# Patient Record
Sex: MALE | Race: ASIAN | NOT HISPANIC OR LATINO | Employment: OTHER | URBAN - METROPOLITAN AREA
[De-identification: names, ages, dates, MRNs, and addresses within clinical notes are randomized per-mention and may not be internally consistent; named-entity substitution may affect disease eponyms.]

---

## 2023-01-28 ENCOUNTER — HOSPITAL ENCOUNTER (EMERGENCY)
Facility: MEDICAL CENTER | Age: 28
End: 2023-01-28
Attending: EMERGENCY MEDICINE
Payer: OTHER GOVERNMENT

## 2023-01-28 ENCOUNTER — APPOINTMENT (OUTPATIENT)
Dept: RADIOLOGY | Facility: MEDICAL CENTER | Age: 28
End: 2023-01-28
Attending: EMERGENCY MEDICINE
Payer: OTHER GOVERNMENT

## 2023-01-28 VITALS
DIASTOLIC BLOOD PRESSURE: 76 MMHG | WEIGHT: 211.2 LBS | TEMPERATURE: 96.6 F | HEIGHT: 69 IN | RESPIRATION RATE: 16 BRPM | SYSTOLIC BLOOD PRESSURE: 158 MMHG | OXYGEN SATURATION: 98 % | BODY MASS INDEX: 31.28 KG/M2 | HEART RATE: 82 BPM

## 2023-01-28 DIAGNOSIS — S43.004A DISLOCATION OF RIGHT SHOULDER JOINT, INITIAL ENCOUNTER: ICD-10-CM

## 2023-01-28 PROCEDURE — 94760 N-INVAS EAR/PLS OXIMETRY 1: CPT

## 2023-01-28 PROCEDURE — 94799 UNLISTED PULMONARY SVC/PX: CPT

## 2023-01-28 PROCEDURE — 700111 HCHG RX REV CODE 636 W/ 250 OVERRIDE (IP): Performed by: EMERGENCY MEDICINE

## 2023-01-28 PROCEDURE — 23650 CLTX SHO DSLC W/MNPJ WO ANES: CPT

## 2023-01-28 PROCEDURE — 96374 THER/PROPH/DIAG INJ IV PUSH: CPT | Mod: XU

## 2023-01-28 PROCEDURE — 99285 EMERGENCY DEPT VISIT HI MDM: CPT

## 2023-01-28 PROCEDURE — 96375 TX/PRO/DX INJ NEW DRUG ADDON: CPT | Mod: XU

## 2023-01-28 PROCEDURE — 73030 X-RAY EXAM OF SHOULDER: CPT | Mod: LT

## 2023-01-28 PROCEDURE — 73080 X-RAY EXAM OF ELBOW: CPT | Mod: LT

## 2023-01-28 RX ORDER — OXYCODONE HYDROCHLORIDE AND ACETAMINOPHEN 5; 325 MG/1; MG/1
1 TABLET ORAL EVERY 6 HOURS PRN
Qty: 12 TABLET | Refills: 0 | Status: SHIPPED | OUTPATIENT
Start: 2023-01-28 | End: 2023-01-31

## 2023-01-28 RX ORDER — HYDROMORPHONE HYDROCHLORIDE 1 MG/ML
0.5 INJECTION, SOLUTION INTRAMUSCULAR; INTRAVENOUS; SUBCUTANEOUS ONCE
Status: COMPLETED | OUTPATIENT
Start: 2023-01-28 | End: 2023-01-28

## 2023-01-28 RX ORDER — ONDANSETRON 2 MG/ML
4 INJECTION INTRAMUSCULAR; INTRAVENOUS ONCE
Status: COMPLETED | OUTPATIENT
Start: 2023-01-28 | End: 2023-01-28

## 2023-01-28 RX ORDER — PROPOFOL 10 MG/ML
200 INJECTION, EMULSION INTRAVENOUS ONCE
Status: COMPLETED | OUTPATIENT
Start: 2023-01-28 | End: 2023-01-28

## 2023-01-28 RX ADMIN — HYDROMORPHONE HYDROCHLORIDE 0.5 MG: 1 INJECTION, SOLUTION INTRAMUSCULAR; INTRAVENOUS; SUBCUTANEOUS at 15:44

## 2023-01-28 RX ADMIN — PROPOFOL 100 MG: 10 INJECTION, EMULSION INTRAVENOUS at 16:04

## 2023-01-28 RX ADMIN — ONDANSETRON 4 MG: 2 INJECTION INTRAMUSCULAR; INTRAVENOUS at 15:43

## 2023-01-28 NOTE — ED NOTES
Pt to bev 10  Noted pt in pain, shaking   Pt's shirt removed  Sling applied and ice pack given   XR ordered

## 2023-01-29 NOTE — RESPIRATORY CARE
Conscious Sedation Respiratory Update       O2 (LPM): 1 (01/28/23 0940)       Events/Summary/Plan: pt awake & conversant following procedure.  02 decreased to 1 lpm nc ETC02 monitoring continuous prior, during and shortly after procedure

## 2023-01-29 NOTE — ED NOTES
Pt awake, alert and oriented and is cleared for d/c  dischg instructions given to pt  Verbally understands  Aware that Rx percocet was sent to listed pharmacy and he is to  med's and take as directed  D/c'ed to home w/ friend driving pt home

## 2023-01-29 NOTE — ED NOTES
Procedure with sedation completed.  No adverse reaction during procedure  Pt awake alert at this time  Xray pending

## 2023-01-29 NOTE — DISCHARGE INSTRUCTIONS
Keep your shoulder at rest and protected.  Call an orthopedic doctor in your community as soon as you get home and arrange office recheck as soon as possible.  If you have new or worsening symptoms or pain is out of control while in [place] return here and if you have new or worsening symptoms or severe pain elsewhere go to the closest ER for recheck

## 2023-01-29 NOTE — ED PROVIDER NOTES
"ED Provider Note    CHIEF COMPLAINT  Chief Complaint   Patient presents with    Shoulder Injury     Landed on L elbow snowboarding and feels that it pushed L shoulder upwards during fall. Pt. Endorses L shoulder pain - C&S intact. Denies other injury with fall at this time.      HPI/ROS    Claude Frost is a 27 y.o. male who presents to the emergency department with left shoulder pain.  The patient is visiting from [place] he was snowboarding today and fell and says that he struck the snow with his left elbow and then his arm was forced up over his head and he felt a pop and severe pain in the left shoulder and now it is extremely painful to try and move it.  He did not strike his head or lose consciousness and feels that his injury is isolated now to his right arm and shoulder.  His last oral intake was around 10 AM this morning.    PAST MEDICAL HISTORY   has a past medical history of Patient denies medical problems.    SURGICAL HISTORY  patient denies any surgical history    FAMILY HISTORY  History reviewed. No pertinent family history.    SOCIAL HISTORY  Social History     Tobacco Use    Smoking status: Never    Smokeless tobacco: Never   Substance and Sexual Activity    Alcohol use: Yes     Comment: occ    Drug use: Not Currently    Sexual activity: Not on file       CURRENT MEDICATIONS  Home Medications       Reviewed by Mara Kenney R.N. (Registered Nurse) on 01/28/23 at 1425  Med List Status: Not Addressed     Medication Last Dose Status        Patient Rodrigo Taking any Medications                           ALLERGIES  No Known Allergies    PHYSICAL EXAM  VITAL SIGNS: BP (!) 158/76   Pulse 82   Temp 35.9 °C (96.6 °F) (Temporal)   Resp 16   Ht 1.753 m (5' 9\")   Wt 95.8 kg (211 lb 3.2 oz)   SpO2 98%   BMI 31.19 kg/m²    Constitutional: Awake verbal well-appearing but uncomfortable appearing individual  HENT: No sign of trauma to the head  Neck: Trachea midline no JVD  Cardiovascular: Regular " rate and rhythm  Respiratory: Clear bilaterally with no apparent difficulty breathing  Skin: Warm and dry  Musculoskeletal: The patient is guarding the left arm there is an obvious sulcus sign at the left shoulder suggesting dislocation.  Distally there is no tenderness of the hand wrist forearm around the elbow  Neurologic: Sensation is intact in the left hand the patient can make a thumbs up sign he can make a fist and open and close his hand without deficit        DIAGNOSTIC STUDIES / PROCEDURES    RADIOLOGY  Radiologist interpretation: Initial x-ray and report from the radiologist indicates an anterior dislocation of the left shoulder.  Subsequent postreduction x-ray shows adequate reduction please see the reports for details    COURSE & MEDICAL DECISION MAKING  In the emergency department an IV was established the patient was initially given intravenous Dilaudid and Zofran for discomfort and he was kept NPO.  I reviewed all the findings with the patient and he will require a procedure for reduction of left shoulder dislocation.    Procedure note  The presedation evaluation is the H&P above.  Risks and benefits of sedation were discussed in detail with the patient and informed consent obtained.  The patient was fully monitored during the procedure for heart rate blood pressure respiratory status oxygen saturation and mental state and the patient remained entirely stable during the entire procedure.  The patient was given intravenous propofol in the titrated fashion for a total of 100 mg and once he was adequately relaxed I was able to manipulate the left shoulder which popped back into place with minimal effort and the sulcus sign immediately resolved.  Post reduction x-rays show good position the patient was placed in a shoulder immobilizer.  Post sedation evaluation shows that the patient woke up rapidly he is amnestic to the reduction in much more comfortable at this time and hemodynamically  stable.    Following the procedure x-ray shows good position of the dislocated shoulder which is now been reduced.  The patient is a lot more comfortable.  I think it is going to be safe for him to go home I discussed use of narcotic pain medications and risks and benefits and informed consent obtained and the patient was given a prescription for a 3-day course of Percocet.  The patient is to keep the left arm at rest and protected and as soon as he gets home to [place] he is to contact an orthopedic clinic and arrange office follow-up with soon as possible as he will need recheck and likely physical therapy in the near future if he discovers new or worsening symptoms or any problems while he is here in [place] he is to return at once for recheck    FINAL DIAGNOSIS  1. Dislocation of right shoulder joint, initial encounter    2.  Procedural sedation for large joint reduction       Electronically signed by: Pedrito Gregory M.D., 1/28/2023 5:57 PM    Addendum on February 11, 2023 at 12:25 AM.  I would like to correct final diagnosis, the dislocation was the left shoulder not the right shoulder